# Patient Record
Sex: MALE | URBAN - METROPOLITAN AREA
[De-identification: names, ages, dates, MRNs, and addresses within clinical notes are randomized per-mention and may not be internally consistent; named-entity substitution may affect disease eponyms.]

---

## 2022-06-15 ENCOUNTER — OFFICE VISIT (OUTPATIENT)
Dept: URBAN - METROPOLITAN AREA CLINIC 28 | Facility: CLINIC | Age: 37
End: 2022-06-15
Payer: MEDICAID

## 2022-06-15 DIAGNOSIS — E10.3391 TYPE 1 DIAB W MODERATE NONPRLF DIAB RTNOP W/O MACULAR EDEMA, RIGHT EYE: ICD-10-CM

## 2022-06-15 DIAGNOSIS — E10.3592 TYPE 1 DIABETES MELLITUS W/ PROLIFERATIVE DIABETIC RETINOPATHY W/O MACULAR EDEMA, LEFT EYE: Primary | ICD-10-CM

## 2022-06-15 PROCEDURE — 92134 CPTRZ OPH DX IMG PST SGM RTA: CPT | Performed by: OPTOMETRIST

## 2022-06-15 PROCEDURE — 92004 COMPRE OPH EXAM NEW PT 1/>: CPT | Performed by: OPTOMETRIST

## 2022-06-15 ASSESSMENT — KERATOMETRY
OS: 46.38
OD: 46.75

## 2022-06-15 ASSESSMENT — INTRAOCULAR PRESSURE
OD: 18
OS: 18

## 2022-06-15 NOTE — IMPRESSION/PLAN
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 Plan: Educated on importance of blood glucose control as related to ocular health. Proliferative diabetic retinopathy without maculopathy present OS. Based on exam findings and level of retinopathy, referring to retina for management. Monitor as directed.

## 2022-06-15 NOTE — IMPRESSION/PLAN
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 Plan: See above.

## 2022-06-16 ENCOUNTER — OFFICE VISIT (OUTPATIENT)
Dept: URBAN - METROPOLITAN AREA CLINIC 54 | Facility: CLINIC | Age: 37
End: 2022-06-16
Payer: MEDICAID

## 2022-06-16 DIAGNOSIS — H25.13 AGE-RELATED NUCLEAR CATARACT, BILATERAL: ICD-10-CM

## 2022-06-16 DIAGNOSIS — H35.372 PUCKERING OF MACULA, LEFT EYE: ICD-10-CM

## 2022-06-16 PROCEDURE — 92134 CPTRZ OPH DX IMG PST SGM RTA: CPT | Performed by: OPHTHALMOLOGY

## 2022-06-16 PROCEDURE — 92004 COMPRE OPH EXAM NEW PT 1/>: CPT | Performed by: OPHTHALMOLOGY

## 2022-06-16 ASSESSMENT — INTRAOCULAR PRESSURE
OS: 12
OD: 18

## 2022-06-16 NOTE — IMPRESSION/PLAN
Impression: Type 1 diab with prolif diab rtnop without mclr edema, l eye: D63.8448. Left. -s/p PRP
- Hx of injections OCT: 
OD: wnl OS: tr ERM Plan: Pt with history of PDR activity s/p PRP 8-10 months ago. Has mostly regressed disease with some smoldering activity. Has significant peripheral fibrosis that is relatively flat. Pt had recent mild VH. Discussed with patient, options include observation, injections, PRP fill in, and surgery. He is currently seeing Dr Reilly Carroll who has also laid out similar options. He is reluctant to have surgery at this point. Given good VA and minimal NV activity, I think close observation can also be an option. He currently is being followed monthly which is very appropriate. I am happy to see him back as needed but he is in great hands with Dr Reilly Carroll.  

RTC PRN

## 2022-06-16 NOTE — IMPRESSION/PLAN
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 Plan: Retinal examination reveals non-proliferative diabetic retinopathy. The diagnosis, natural history, and prognosis of NPDR were discussed at length. The importance of blood sugar, blood pressure, and cholesterol control and their relationship to progression of diabetic retinopathy were reviewed.

## 2022-06-16 NOTE — IMPRESSION/PLAN
Impression: Puckering of macula, left eye: H35.372. Left. Plan: Examination and OCT reveals an ERM which is distorting the macular contour. The diagnosis, natural history, and prognosis of epiretinal membranes, as well as the risks and benefits of vitrectomy with membrane peeling versus observation were discussed at length. Given the patient's current visual acuity and minimal hindrance on activities of daily living, observation was recommended at this time.